# Patient Record
Sex: FEMALE | Race: WHITE | Employment: FULL TIME | URBAN - METROPOLITAN AREA
[De-identification: names, ages, dates, MRNs, and addresses within clinical notes are randomized per-mention and may not be internally consistent; named-entity substitution may affect disease eponyms.]

---

## 2021-04-11 ENCOUNTER — APPOINTMENT (OUTPATIENT)
Dept: GENERAL RADIOLOGY | Age: 36
End: 2021-04-11
Payer: COMMERCIAL

## 2021-04-11 ENCOUNTER — HOSPITAL ENCOUNTER (EMERGENCY)
Age: 36
Discharge: HOME | End: 2021-04-11
Attending: STUDENT IN AN ORGANIZED HEALTH CARE EDUCATION/TRAINING PROGRAM
Payer: COMMERCIAL

## 2021-04-11 VITALS
WEIGHT: 147 LBS | BODY MASS INDEX: 24.49 KG/M2 | HEIGHT: 65 IN | SYSTOLIC BLOOD PRESSURE: 113 MMHG | RESPIRATION RATE: 15 BRPM | DIASTOLIC BLOOD PRESSURE: 68 MMHG | HEART RATE: 78 BPM | TEMPERATURE: 98.3 F | OXYGEN SATURATION: 99 %

## 2021-04-11 DIAGNOSIS — R00.2 PALPITATIONS: Primary | ICD-10-CM

## 2021-04-11 LAB
A/G RATIO: 1.4 (ref 1.1–2.2)
ALBUMIN SERPL-MCNC: 4.8 G/DL (ref 3.4–5)
ALP BLD-CCNC: 73 U/L (ref 40–129)
ALT SERPL-CCNC: 24 U/L (ref 10–40)
ANION GAP SERPL CALCULATED.3IONS-SCNC: 10 MMOL/L (ref 3–16)
AST SERPL-CCNC: 18 U/L (ref 15–37)
BASOPHILS ABSOLUTE: 0.1 K/UL (ref 0–0.2)
BASOPHILS RELATIVE PERCENT: 1 %
BILIRUB SERPL-MCNC: 0.8 MG/DL (ref 0–1)
BUN BLDV-MCNC: 7 MG/DL (ref 7–20)
CALCIUM SERPL-MCNC: 9.6 MG/DL (ref 8.3–10.6)
CHLORIDE BLD-SCNC: 101 MMOL/L (ref 99–110)
CO2: 25 MMOL/L (ref 21–32)
CREAT SERPL-MCNC: 0.6 MG/DL (ref 0.6–1.1)
D DIMER: <200 NG/ML DDU (ref 0–229)
EKG ATRIAL RATE: 94 BPM
EKG DIAGNOSIS: NORMAL
EKG P AXIS: 44 DEGREES
EKG P-R INTERVAL: 130 MS
EKG Q-T INTERVAL: 378 MS
EKG QRS DURATION: 80 MS
EKG QTC CALCULATION (BAZETT): 472 MS
EKG R AXIS: 52 DEGREES
EKG T AXIS: 29 DEGREES
EKG VENTRICULAR RATE: 94 BPM
EOSINOPHILS ABSOLUTE: 0.1 K/UL (ref 0–0.6)
EOSINOPHILS RELATIVE PERCENT: 1.5 %
GFR AFRICAN AMERICAN: >60
GFR NON-AFRICAN AMERICAN: >60
GLOBULIN: 3.4 G/DL
GLUCOSE BLD-MCNC: 106 MG/DL (ref 70–99)
HCT VFR BLD CALC: 40.1 % (ref 36–48)
HEMOGLOBIN: 14.1 G/DL (ref 12–16)
LYMPHOCYTES ABSOLUTE: 1.1 K/UL (ref 1–5.1)
LYMPHOCYTES RELATIVE PERCENT: 14.9 %
MCH RBC QN AUTO: 30.9 PG (ref 26–34)
MCHC RBC AUTO-ENTMCNC: 35.2 G/DL (ref 31–36)
MCV RBC AUTO: 87.7 FL (ref 80–100)
MONOCYTES ABSOLUTE: 0.5 K/UL (ref 0–1.3)
MONOCYTES RELATIVE PERCENT: 6.2 %
NEUTROPHILS ABSOLUTE: 5.7 K/UL (ref 1.7–7.7)
NEUTROPHILS RELATIVE PERCENT: 76.4 %
PDW BLD-RTO: 13 % (ref 12.4–15.4)
PLATELET # BLD: 306 K/UL (ref 135–450)
PMV BLD AUTO: 8.2 FL (ref 5–10.5)
POTASSIUM REFLEX MAGNESIUM: 4 MMOL/L (ref 3.5–5.1)
RBC # BLD: 4.58 M/UL (ref 4–5.2)
SODIUM BLD-SCNC: 136 MMOL/L (ref 136–145)
TOTAL PROTEIN: 8.2 G/DL (ref 6.4–8.2)
TROPONIN: <0.01 NG/ML
WBC # BLD: 7.5 K/UL (ref 4–11)

## 2021-04-11 PROCEDURE — 93010 ELECTROCARDIOGRAM REPORT: CPT | Performed by: INTERNAL MEDICINE

## 2021-04-11 PROCEDURE — 85025 COMPLETE CBC W/AUTO DIFF WBC: CPT

## 2021-04-11 PROCEDURE — 71046 X-RAY EXAM CHEST 2 VIEWS: CPT

## 2021-04-11 PROCEDURE — 93005 ELECTROCARDIOGRAM TRACING: CPT | Performed by: STUDENT IN AN ORGANIZED HEALTH CARE EDUCATION/TRAINING PROGRAM

## 2021-04-11 PROCEDURE — 84484 ASSAY OF TROPONIN QUANT: CPT

## 2021-04-11 PROCEDURE — 99283 EMERGENCY DEPT VISIT LOW MDM: CPT

## 2021-04-11 PROCEDURE — 85379 FIBRIN DEGRADATION QUANT: CPT

## 2021-04-11 PROCEDURE — 80053 COMPREHEN METABOLIC PANEL: CPT

## 2021-04-11 NOTE — ED NOTES
Pt stable for DC to home. Reviewed DC instructions, follow up; pt verbalized understanding and ambulated off unit without trouble.       Luh Osborne RN  04/11/21 [id number]

## 2021-04-11 NOTE — ED PROVIDER NOTES
[address] ED      CHIEF COMPLAINT  Tachycardia (patient states she was at work and felt like her heart was racing. patient states her HR got up to 120's. Denies chest pain, states her mouth feels numb, denies any other symptoms. )     HISTORY OF PRESENT ILLNESS  Steve Charles is a 39 y.o. female  who presents to the ED complaining of palpitations. Patient states that she has had 2 episodes over the past 24 hours where she has felt her heart racing and has had her heart rate as high as the 120s. States that she feels like the roof of her mouth is also numb. She denies any chest pain or shortness of breath. She states that each episode lasted approximately 15 to 20 minutes. She denies any numbness or tingling. She denies other complaints or concerns. No other complaints, modifying factors or associated symptoms. I have reviewed the following from the nursing documentation. Past Medical History:   Diagnosis Date    Visual floaters     see snapshot;     Past Surgical History:   Procedure Laterality Date     SECTION, LOW TRANSVERSE      placenta previa     History reviewed. No pertinent family history.   Social History     Socioeconomic History    Marital status: Legally      Spouse name: Not on file    Number of children: Not on file    Years of education: Not on file    Highest education level: Not on file   Occupational History    Not on file   Social Needs    Financial resource strain: Not on file    Food insecurity     Worry: Not on file     Inability: Not on file    Transportation needs     Medical: Not on file     Non-medical: Not on file   Tobacco Use    Smoking status: Never Smoker    Smokeless tobacco: Never Used   Substance and Sexual Activity    Alcohol use: No    Drug use: No    Sexual activity: Not on file   Lifestyle    Physical activity     Days per week: Not on file     Minutes per session: Not on file    Stress: Not on file   Relationships  Social connections     Talks on phone: Not on file     Gets together: Not on file     Attends [employer] witness service: Not on file     Active member of club or organization: Not on file     Attends meetings of clubs or organizations: Not on file     Relationship status: Not on file    Intimate partner violence     Fear of current or ex partner: Not on file     Emotionally abused: Not on file     Physically abused: Not on file     Forced sexual activity: Not on file   Other Topics Concern    Not on file   Social History Narrative    3/2013 lives with spouse,2 children;hw; drives and ; No current facility-administered medications for this encounter. Current Outpatient Medications   Medication Sig Dispense Refill    acetaminophen (TYLENOL) 325 MG tablet Take 650 mg by mouth every 6 hours as needed for Pain      ibuprofen (ADVIL;MOTRIN) 800 MG tablet Take 800 mg by mouth every 6 hours as needed for Pain       Allergies   Allergen Reactions    Percocet [Oxycodone-Acetaminophen] Other (See Comments)     hallucinations       REVIEW OF SYSTEMS  10 systems reviewed, pertinent positives per HPI otherwise noted to be negative. PHYSICAL EXAM  /68   Pulse 78   Temp 98.3 °F (36.8 °C) (Oral)   Resp 15   Ht 5' 5\" (1.651 m)   Wt 147 lb (66.7 kg)   SpO2 99%   BMI 24.46 kg/m²    GENERAL APPEARANCE: Awake and alert. Cooperative. No acute distress. HENT: Normocephalic. Atraumatic. NECK: Supple. EYES: PERRL. EOM's grossly intact. HEART/CHEST: RRR. No murmurs. LUNGS: Respirations unlabored. CTAB. Good air exchange. Speaking comfortably in full sentences. ABDOMEN: No tenderness. Soft. Non-distended. No masses. No organomegaly. No guarding or rebound. MUSCULOSKELETAL: No extremity edema. Compartments soft. No deformity. No tenderness in the extremities. All extremities neurovascularly intact. SKIN: Warm and dry. No acute rashes. NEUROLOGICAL: Alert and oriented.  CN's 2-12 intact. No gross facial drooping. Strength 5/5, sensation intact. PSYCHIATRIC: Appears anxious    LABS  I have reviewed all labs for this visit.    Results for orders placed or performed during the hospital encounter of 04/11/21   CBC Auto Differential   Result Value Ref Range    WBC 7.5 4.0 - 11.0 K/uL    RBC 4.58 4.00 - 5.20 M/uL    Hemoglobin 14.1 12.0 - 16.0 g/dL    Hematocrit 40.1 36.0 - 48.0 %    MCV 87.7 80.0 - 100.0 fL    MCH 30.9 26.0 - 34.0 pg    MCHC 35.2 31.0 - 36.0 g/dL    RDW 13.0 12.4 - 15.4 %    Platelets 426 042 - 022 K/uL    MPV 8.2 5.0 - 10.5 fL    Neutrophils % 76.4 %    Lymphocytes % 14.9 %    Monocytes % 6.2 %    Eosinophils % 1.5 %    Basophils % 1.0 %    Neutrophils Absolute 5.7 1.7 - 7.7 K/uL    Lymphocytes Absolute 1.1 1.0 - 5.1 K/uL    Monocytes Absolute 0.5 0.0 - 1.3 K/uL    Eosinophils Absolute 0.1 0.0 - 0.6 K/uL    Basophils Absolute 0.1 0.0 - 0.2 K/uL   Comprehensive Metabolic Panel w/ Reflex to MG   Result Value Ref Range    Sodium 136 136 - 145 mmol/L    Potassium reflex Magnesium 4.0 3.5 - 5.1 mmol/L    Chloride 101 99 - 110 mmol/L    CO2 25 21 - 32 mmol/L    Anion Gap 10 3 - 16    Glucose 106 (H) 70 - 99 mg/dL    BUN 7 7 - 20 mg/dL    CREATININE 0.6 0.6 - 1.1 mg/dL    GFR Non-African American >60 >60    GFR African American >60 >60    Calcium 9.6 8.3 - 10.6 mg/dL    Total Protein 8.2 6.4 - 8.2 g/dL    Albumin 4.8 3.4 - 5.0 g/dL    Albumin/Globulin Ratio 1.4 1.1 - 2.2    Total Bilirubin 0.8 0.0 - 1.0 mg/dL    Alkaline Phosphatase 73 40 - 129 U/L    ALT 24 10 - 40 U/L    AST 18 15 - 37 U/L    Globulin 3.4 g/dL   D-Dimer, Quantitative   Result Value Ref Range    D-Dimer, Quant <200 0 - 229 ng/mL DDU   Troponin   Result Value Ref Range    Troponin <0.01 <0.01 ng/mL   EKG 12 Lead   Result Value Ref Range    Ventricular Rate 94 BPM    Atrial Rate 94 BPM    P-R Interval 130 ms    QRS Duration 80 ms    Q-T Interval 378 ms    QTc Calculation (Bazett) 472 ms    P Axis 44 degrees    R Axis 52 degrees    T Axis 29 degrees    Diagnosis       Normal sinus rhythmNormal ECGNo previous ECGs availableConfirmed by BHANU BRAUN MD ([id number]) on 4/11/2021 10:59:22 AM       ECG  The Ekg interpreted by me shows  normal sinus rhythm with a rate of 94  Axis is   Normal  QTc is  prolonged at 472  Intervals and Durations are unremarkable. ST Segments: nonspecific changes  No previous available for comparison    RADIOLOGY  XR CHEST (2 VW)   Final Result   1. No acute abnormality. ED COURSE/MDM  Patient seen and evaluated. Old records reviewed. Labs and imaging reviewed and results discussed with patient. Patient is a 78-year-old female, presenting with concerns for palpitations and tachycardia. Full HPI as detailed above. On arrival in the ED, vitals reassuring. Patient is not currently tachycardic. States she has had 2 episodes of palpitations over the past 24 hours. Denies any associated chest pain. EKG without evidence of acute ischemia or arrhythmia. Labs were performed and reassuring. No acute concerning electrolyte abnormalities. No evidence of leukocytosis or anemia. Chest x-ray without any acute findings. D-dimer negative. Patient was reassessed and continued to feel well. She states that she is just feeling tired. She does not have a primary care provider and will be given a referral to follow-up with primary care if her symptoms do not improve over the next few days. She will be discharged home. She is comfortable in agreement with plan of care. Given return precautions for the ED. During the patient's ED course, the patient was given:  Medications - No data to display     CLINICAL IMPRESSION  1. Palpitations        Blood pressure 113/68, pulse 78, temperature 98.3 °F (36.8 °C), temperature source Oral, resp. rate 15, height 5' 5\" (1.651 m), weight 147 lb (66.7 kg), SpO2 99 %. DISPOSITION  Ximena Abreu was discharged to home in good condition.     Patient was given scripts for the following medications. I counseled patient how to take these medications. Discharge Medication List as of 4/11/2021 11:55 AM          Follow-up with:  Seun Townsend  [phone]  Schedule an appointment as soon as possible for a visit       [place] [facility]  [address] [facility]  [phone]  Go to   If symptoms worsen      DISCLAIMER: This chart was created using Dragon dictation software. Efforts were made by me to ensure accuracy, however some errors may be present due to limitations of this technology and occasionally words are not transcribed correctly.        Ney Law MD  04/11/21 [id number]